# Patient Record
Sex: MALE | Race: OTHER | NOT HISPANIC OR LATINO | URBAN - METROPOLITAN AREA
[De-identification: names, ages, dates, MRNs, and addresses within clinical notes are randomized per-mention and may not be internally consistent; named-entity substitution may affect disease eponyms.]

---

## 2021-02-22 ENCOUNTER — OUTPATIENT (OUTPATIENT)
Dept: OUTPATIENT SERVICES | Facility: HOSPITAL | Age: 36
LOS: 1 days | Discharge: HOME | End: 2021-02-22

## 2021-02-22 RX ORDER — OXYCODONE HYDROCHLORIDE 5 MG/1
1 TABLET ORAL
Qty: 15 | Refills: 0
Start: 2021-02-22

## 2024-08-11 ENCOUNTER — EMERGENCY (EMERGENCY)
Facility: HOSPITAL | Age: 39
LOS: 1 days | Discharge: HOME | End: 2024-08-11
Attending: EMERGENCY MEDICINE | Admitting: EMERGENCY MEDICINE

## 2024-08-11 VITALS
TEMPERATURE: 98 F | SYSTOLIC BLOOD PRESSURE: 113 MMHG | HEIGHT: 69 IN | HEART RATE: 108 BPM | WEIGHT: 169.98 LBS | OXYGEN SATURATION: 96 % | DIASTOLIC BLOOD PRESSURE: 75 MMHG | RESPIRATION RATE: 16 BRPM

## 2024-08-11 PROCEDURE — 99284 EMERGENCY DEPT VISIT MOD MDM: CPT

## 2024-08-11 RX ORDER — IBUPROFEN 200 MG
600 TABLET ORAL ONCE
Refills: 0 | Status: COMPLETED | OUTPATIENT
Start: 2024-08-11 | End: 2024-08-11

## 2024-08-11 RX ADMIN — Medication 600 MILLIGRAM(S): at 09:02

## 2024-08-11 NOTE — ED PROVIDER NOTE - PATIENT PORTAL LINK FT
You can access the FollowMyHealth Patient Portal offered by [facility] by registering at the following website: [email or web address]. By joining [employer]’s FollowMyHealth portal, you will also be able to view your health information using other applications (apps) compatible with our system.

## 2024-08-11 NOTE — ED PROVIDER NOTE - OBJECTIVE STATEMENT
38 y/o male pmh lymphoma c/o R chest wall mahesh nand abd pain. Pt is currently arrested by [facility] and stating that he had his R chest wall port taken out several days ago and it is painful. Pt also c/o diffuse abd pain "since starting chemo" but worse x 1 week, associated with n/v/d. pt states that he was at a house party this evening and left and was pulled over and now arrested. Pt admits to using marijuana around 12PM yesterday, denies ETOH use or other illicit drug use. Pt is refusing blood work or imaging, stating that all his care is a [facility] and wants to go there. Pt is amenable to Motrin at this time

## 2024-08-11 NOTE — ED ADULT NURSE NOTE - OBJECTIVE STATEMENT
pt brought with [facility] pt arrives in cuffs pt A&Ox4 refuse to answer any questions pt is calm at this time appears well

## 2024-08-11 NOTE — ED PROVIDER NOTE - ATTENDING APP SHARED VISIT CONTRIBUTION OF CARE
GEN - nontoxic; fully alert and oriented x3   HEAD - NC/AT   EYES- PERRL, EOMI  ENT: Airway patent, mmm, Oral cavity and pharynx normal. No inflammation, swelling, exudate, or lesions.  NECK: Neck supple  PULMONARY - CTA b/l, symmetric breath sounds.   CARDIAC -s1s2, RRR, no M,G,R  R. chest wall with well healed linear scar, no scabbing, no induration, no dehiscence, no erythema, nontender, no fb palpated below.  ABDOMEN - +BS, ND, NT, soft, no guarding, no mass palpated  BACK - no CVA tenderness  EXTREMITIES - FROM, no deformity/no edema  SKIN - no rash or bruising   NEUROLOGIC - alert, speech clear, no focal deficits  PSYCH -mildly agitated, cooperative with exam, linear, demonstrates full capacity to understand his current situation, has insight into his condition.  a/p-39m w/ reported hx of lymphoma (treated out osh)  under arrest with [facility], presenting to ed for discomfort to recently removed port site and abd pain. Reports he had his port removed few d ago, and his lymphoma has been treated. Also reports he has had chronic intermittent abd pain since before he started chemo and that it intermittently exacerbates, feels like its worse x 1 w and assoc w/ n/v/d, has been able to intermittently lilian po. No fevers, cough, cp, sob, dysuria, bleeding. Reports he left house party in evening, was pulled over, and is not under arrest. Denies etoh, reports marijuana use yesterday 12 pm. Exam with well healed scar to r. chest wall with no signs of infection and nontender. Patient is mildly agitated primarily at being under arrest and in a hospital that is not the hospital he normally receives care at ([facility]). He is cooperative with our exam, linear, oriented, demonstrates full understanding of his circumstance and our recommendations. We recommended to do labs/ct a/p to evaluate for diff including but not limited to obstruction, infection, masses and/or other intraabd pathology. This was said to him in plain and simple language of which he demonstrated good understanding. he states he just had scans last week at [facility] and they didn't show anything new and does not want any labs or imaging here in hospital. We explained in clear language that without testing we can not properly rule out emergency conditions that are life threatening and can lead to permanent disability and death. he understands this and was able to repeat it back and states he will follow up with  [facility] as soon as he is able. He was informed he is welcome to change his mind at any time and we can pursue workup and further care. He was also offered medications to help his pain and he was agreeable to motrin. [facility] present with patient.

## 2024-08-11 NOTE — ED PROVIDER NOTE - CLINICAL SUMMARY MEDICAL DECISION MAKING FREE TEXT BOX
40 y/o male pmh lymphoma c/o R chest wall pain and diffuse abd pain, currently arrested by [facility]. pt admits to having a chest wall port removed several days ago and it is painful. Also c/o diffuse abd pain "since starting chemo" which is worse x 1 week, associated with n/v/d. Pt admits to using marijuana x1 day ago, denies ETOH or other illicit drug use. Pt states that he was pulled over after leaving a party and is now arrested. Pt is refusing all blood work and imaging at this time, stating that his care is at [facility] and he wants to be treated there. Pt made aware that we are concerned for possbile SBO vs intraabdominal infection and he understands the risks of not obtaining any tests. Pt is a&ox3 and capable to make his own medical decisions. Pt is speaking clearly and following commands. Pt will take motrin but does not want anything else. Of note, R chest wall scar is dry, clean and intact, wound appears several months old, no new surgical wound or sutures, no erythema, non tender. abd soft, minimal diffuse tenderness, no peritoneal signs. Will dc with police.